# Patient Record
Sex: MALE | Race: OTHER | NOT HISPANIC OR LATINO
[De-identification: names, ages, dates, MRNs, and addresses within clinical notes are randomized per-mention and may not be internally consistent; named-entity substitution may affect disease eponyms.]

---

## 2017-05-23 ENCOUNTER — TRANSCRIPTION ENCOUNTER (OUTPATIENT)
Age: 47
End: 2017-05-23

## 2018-12-13 ENCOUNTER — TRANSCRIPTION ENCOUNTER (OUTPATIENT)
Age: 48
End: 2018-12-13

## 2020-03-08 ENCOUNTER — TRANSCRIPTION ENCOUNTER (OUTPATIENT)
Age: 50
End: 2020-03-08

## 2023-01-19 ENCOUNTER — NON-APPOINTMENT (OUTPATIENT)
Age: 53
End: 2023-01-19

## 2023-01-19 ENCOUNTER — APPOINTMENT (OUTPATIENT)
Dept: CARDIOLOGY | Facility: CLINIC | Age: 53
End: 2023-01-19
Payer: COMMERCIAL

## 2023-01-19 VITALS
OXYGEN SATURATION: 96 % | DIASTOLIC BLOOD PRESSURE: 82 MMHG | WEIGHT: 186 LBS | BODY MASS INDEX: 26.04 KG/M2 | HEART RATE: 67 BPM | TEMPERATURE: 97.9 F | SYSTOLIC BLOOD PRESSURE: 128 MMHG | HEIGHT: 71 IN

## 2023-01-19 PROCEDURE — 93000 ELECTROCARDIOGRAM COMPLETE: CPT | Mod: 59

## 2023-01-19 PROCEDURE — 99204 OFFICE O/P NEW MOD 45 MIN: CPT

## 2023-01-19 PROCEDURE — 93242 EXT ECG>48HR<7D RECORDING: CPT

## 2023-01-19 NOTE — HISTORY OF PRESENT ILLNESS
[FreeTextEntry1] : 52M h/o former smoker, erectile dysfunction, Thalassemia trait, H. pylori, COVID infection 1/2022,  prostate cancer s/p resection 1/4/23 at [facility] [place] follows with Dr. Noa Encarnacion, has symptoms of intermittent dizziness/palpitations for few months worsened since postop, EKG unremarkable, refer for cardiology evaluation. \par \par Reports been noticing exertional tachycardia with walking stairs until he rest, this been ongoing since use of Lupron about 9 months ago pending last injection in February, used to be active with running, bicycling and plays soccer with his son, been feeling fatigued and tired since prostate cancer treatment. Not able to exercise due to recent surgery, denies any complications. Has not been drinking any coffee. \par \par prior chest pain with normal Echo and exercise nuclear stress test in 2016\par \par Former smoker 20yrs, quit 2021 \par No CAD or stroke in family

## 2023-01-19 NOTE — CARDIOLOGY SUMMARY
[de-identified] : 1/19/23- Sinus 65, normal axis, no ST changes, QTc 398 [de-identified] : 8/2016- E-NST for 11 min, no ischemia/infarct, LV EF 52% [de-identified] : 7/2016- LV EF 60%

## 2023-01-19 NOTE — DISCUSSION/SUMMARY
[FreeTextEntry1] : 52M h/o former smoker, erectile dysfunction, Thalassemia trait, H. pylori, COVID infection 1/2022,  prostate cancer s/p resection 1/4/23 at [facility] [place] follows with Dr. Noa Encarnacion, has symptoms of intermittent dizziness/palpitations for few months worsened since postop, EKG unremarkable, refer for cardiology evaluation. \par \par Exertional palpitations unclear etiology been ongoing for months, EKG within normal, will obtain Holter with symptoms diary and repeat Echocardiogram. Defer exercise stress test for now as recent postop. \par \par \par 1. Exertional palpitations- await Holter and TTE. \par \par 2. Prostate cancer- s/p resection to complete last Lupron injection; follow up with [facility] [place]. \par \par \par \par \par Follow up in 2 months to reassess symptoms if persists then obtain exercise stress test.  [EKG obtained to assist in diagnosis and management of assessed problem(s)] : EKG obtained to assist in diagnosis and management of assessed problem(s)

## 2023-02-06 ENCOUNTER — APPOINTMENT (OUTPATIENT)
Dept: CARDIOLOGY | Facility: CLINIC | Age: 53
End: 2023-02-06
Payer: COMMERCIAL

## 2023-02-06 PROCEDURE — 93306 TTE W/DOPPLER COMPLETE: CPT

## 2023-02-09 ENCOUNTER — TRANSCRIPTION ENCOUNTER (OUTPATIENT)
Age: 53
End: 2023-02-09

## 2023-03-20 ENCOUNTER — APPOINTMENT (OUTPATIENT)
Dept: CARDIOLOGY | Facility: CLINIC | Age: 53
End: 2023-03-20
Payer: COMMERCIAL

## 2023-03-20 VITALS
OXYGEN SATURATION: 96 % | WEIGHT: 192.5 LBS | BODY MASS INDEX: 26.95 KG/M2 | HEIGHT: 71 IN | TEMPERATURE: 98.9 F | HEART RATE: 68 BPM | DIASTOLIC BLOOD PRESSURE: 82 MMHG | SYSTOLIC BLOOD PRESSURE: 134 MMHG

## 2023-03-20 DIAGNOSIS — Z85.46 PERSONAL HISTORY OF MALIGNANT NEOPLASM OF PROSTATE: ICD-10-CM

## 2023-03-20 DIAGNOSIS — R00.2 PALPITATIONS: ICD-10-CM

## 2023-03-20 PROCEDURE — 99214 OFFICE O/P EST MOD 30 MIN: CPT

## 2023-03-20 RX ORDER — MV-MIN/FOLIC/VIT K/LYCOP/COQ10 200-100MCG
CAPSULE ORAL DAILY
Refills: 0 | Status: ACTIVE | COMMUNITY

## 2023-03-20 RX ORDER — CETIRIZINE HCL 10 MG
10 TABLET ORAL
Refills: 0 | Status: ACTIVE | COMMUNITY